# Patient Record
Sex: MALE | Race: WHITE | NOT HISPANIC OR LATINO | Employment: FULL TIME | URBAN - METROPOLITAN AREA
[De-identification: names, ages, dates, MRNs, and addresses within clinical notes are randomized per-mention and may not be internally consistent; named-entity substitution may affect disease eponyms.]

---

## 2018-01-09 DIAGNOSIS — E78.5 HYPERLIPIDEMIA, UNSPECIFIED HYPERLIPIDEMIA TYPE: ICD-10-CM

## 2018-01-09 DIAGNOSIS — I10 ESSENTIAL HYPERTENSION: ICD-10-CM

## 2018-01-10 RX ORDER — SIMVASTATIN 40 MG
TABLET ORAL
Qty: 15 TAB | Refills: 0 | Status: SHIPPED | OUTPATIENT
Start: 2018-01-10 | End: 2018-11-21

## 2018-01-10 RX ORDER — METOPROLOL SUCCINATE 100 MG/1
TABLET, EXTENDED RELEASE ORAL
Qty: 15 TAB | Refills: 0 | Status: SHIPPED | OUTPATIENT
Start: 2018-01-10 | End: 2018-11-21

## 2018-01-10 RX ORDER — LISINOPRIL AND HYDROCHLOROTHIAZIDE 20; 12.5 MG/1; MG/1
TABLET ORAL
Qty: 15 TAB | Refills: 0 | Status: SHIPPED | OUTPATIENT
Start: 2018-01-10 | End: 2018-11-21

## 2018-11-21 ENCOUNTER — OFFICE VISIT (OUTPATIENT)
Dept: CARDIOLOGY | Facility: MEDICAL CENTER | Age: 48
End: 2018-11-21
Payer: COMMERCIAL

## 2018-11-21 VITALS
DIASTOLIC BLOOD PRESSURE: 80 MMHG | SYSTOLIC BLOOD PRESSURE: 118 MMHG | OXYGEN SATURATION: 97 % | BODY MASS INDEX: 28.61 KG/M2 | WEIGHT: 211.2 LBS | HEIGHT: 72 IN | HEART RATE: 70 BPM

## 2018-11-21 DIAGNOSIS — I25.10 CORONARY ARTERY DISEASE INVOLVING NATIVE CORONARY ARTERY OF NATIVE HEART WITHOUT ANGINA PECTORIS: ICD-10-CM

## 2018-11-21 DIAGNOSIS — R55 SYNCOPE AND COLLAPSE: ICD-10-CM

## 2018-11-21 DIAGNOSIS — E78.5 DYSLIPIDEMIA: ICD-10-CM

## 2018-11-21 DIAGNOSIS — I10 ESSENTIAL HYPERTENSION, BENIGN: ICD-10-CM

## 2018-11-21 LAB — EKG IMPRESSION: NORMAL

## 2018-11-21 PROCEDURE — 99214 OFFICE O/P EST MOD 30 MIN: CPT | Performed by: INTERNAL MEDICINE

## 2018-11-21 PROCEDURE — 93000 ELECTROCARDIOGRAM COMPLETE: CPT | Performed by: INTERNAL MEDICINE

## 2018-11-21 RX ORDER — ATORVASTATIN CALCIUM 40 MG/1
40 TABLET, FILM COATED ORAL NIGHTLY
COMMUNITY
End: 2019-11-12 | Stop reason: SDUPTHER

## 2018-11-21 RX ORDER — METOPROLOL SUCCINATE 50 MG/1
50 TABLET, EXTENDED RELEASE ORAL DAILY
Qty: 90 TAB | Refills: 3 | Status: SHIPPED | OUTPATIENT
Start: 2018-11-21 | End: 2019-12-04 | Stop reason: SDUPTHER

## 2018-11-21 RX ORDER — LISINOPRIL 20 MG/1
20 TABLET ORAL DAILY
Qty: 90 TAB | Refills: 3 | Status: SHIPPED | OUTPATIENT
Start: 2018-11-21 | End: 2019-12-04 | Stop reason: SDUPTHER

## 2018-11-21 ASSESSMENT — ENCOUNTER SYMPTOMS
NEUROLOGICAL NEGATIVE: 1
NAUSEA: 0
FEVER: 0
SHORTNESS OF BREATH: 0
DOUBLE VISION: 0
COUGH: 0
MUSCULOSKELETAL NEGATIVE: 1
DIZZINESS: 0
BLURRED VISION: 0
WEIGHT LOSS: 0
VOMITING: 0
CARDIOVASCULAR NEGATIVE: 1
NERVOUS/ANXIOUS: 0
CHILLS: 0
CONSTITUTIONAL NEGATIVE: 1
EYES NEGATIVE: 1
PALPITATIONS: 0
BRUISES/BLEEDS EASILY: 0
CLAUDICATION: 0
MYALGIAS: 0
PSYCHIATRIC NEGATIVE: 1
RESPIRATORY NEGATIVE: 1
WEAKNESS: 0
FOCAL WEAKNESS: 0
ABDOMINAL PAIN: 0
GASTROINTESTINAL NEGATIVE: 1
DEPRESSION: 0
HEADACHES: 0

## 2018-11-21 NOTE — PROGRESS NOTES
Chief Complaint   Patient presents with   • Coronary Artery Disease       Subjective:   Peter Isbell is a 47 y.o. male who presents today for follow up of coronary artery disease.    Since the patient's last visit on 11/01/16 with Devorah Hines, he has been doing well clinically until one week ago when he suffered a syncopal episode. He was in [place] sitting at a bar having dinner when he began to feel dizziness. He denies associated chest pain, shortness of breath, palpitations, nausea/vomiting or diaphoresis. He was sitting at a very uncomfortable chair that was pressing on back of his leg.    Past Medical History:   Diagnosis Date   • CAD (coronary artery disease) 12/17/2012   • Heart murmur    • Hyperlipidemia 12/1/2012   • Hypertension      Past Surgical History:   Procedure Laterality Date   • CARDIAC CATH  11/30/12    LAD plague rupt, no stent.     Family History   Problem Relation Age of Onset   • Hypertension Mother    • Heart Disease Father 47        cad     Social History     Social History   • Marital status:      Spouse name: N/A   • Number of children: N/A   • Years of education: N/A     Occupational History   • Not on file.     Social History Main Topics   • Smoking status: Never Smoker   • Smokeless tobacco: Never Used   • Alcohol use Yes      Comment: occ   • Drug use: No   • Sexual activity: Not on file     Other Topics Concern   • Not on file     Social History Narrative   • No narrative on file     No Known Allergies     Medications reviewed.    Outpatient Encounter Prescriptions as of 11/21/2018   Medication Sig Dispense Refill   • atorvastatin (LIPITOR) 40 MG Tab Take 40 mg by mouth every evening.     • metoprolol SR (TOPROL XL) 100 MG TABLET SR 24 HR TAKE 1 TABLET BY MOUTH EVERYDAY. 15 Tab 0   • lisinopril-hydrochlorothiazide (PRINZIDE, ZESTORETIC) 20-12.5 MG per tablet TAKE 1 TABLET BY MOUTH EVERYDAY. 15 Tab 0   • aspirin EC (ECOTRIN) 81 MG Tablet Delayed Response Take 81  mg by mouth. 1/2 tablet qd     • [DISCONTINUED] simvastatin (ZOCOR) 40 MG Tab TAKE 1 TABLET BY MOUTH EVERYDAY. 15 Tab 0     No facility-administered encounter medications on file as of 11/21/2018.      Review of Systems   Constitutional: Negative.  Negative for chills, fever, malaise/fatigue and weight loss.   HENT: Negative.  Negative for hearing loss.    Eyes: Negative.  Negative for blurred vision and double vision.   Respiratory: Negative.  Negative for cough and shortness of breath.    Cardiovascular: Negative.  Negative for chest pain, palpitations, claudication and leg swelling.   Gastrointestinal: Negative.  Negative for abdominal pain, nausea and vomiting.   Genitourinary: Negative.  Negative for dysuria and urgency.   Musculoskeletal: Negative.  Negative for joint pain and myalgias.   Skin: Negative.  Negative for itching and rash.   Neurological: Negative.  Negative for dizziness, focal weakness, weakness and headaches.   Endo/Heme/Allergies: Negative.  Does not bruise/bleed easily.   Psychiatric/Behavioral: Negative.  Negative for depression. The patient is not nervous/anxious.         Objective:   /80 (BP Location: Left arm, Patient Position: Sitting, BP Cuff Size: Adult)   Pulse 70   Ht 1.829 m (6')   Wt 95.8 kg (211 lb 3.2 oz)   SpO2 97%   BMI 28.64 kg/m²     Physical Exam   Constitutional: He is oriented to person, place, and time. He appears well-developed and well-nourished.   HENT:   Head: Normocephalic and atraumatic.   Eyes: Pupils are equal, round, and reactive to light. Conjunctivae are normal.   Neck: Normal range of motion. Neck supple.   Cardiovascular: Normal rate and regular rhythm.    Pulmonary/Chest: Effort normal and breath sounds normal.   Abdominal: Soft. Bowel sounds are normal.   Musculoskeletal: Normal range of motion.   Neurological: He is alert and oriented to person, place, and time.   Skin: Skin is warm and dry.   Psychiatric: He has a normal mood and affect.      CARDIAC STUDIES/PROCEDURES:    CARDIAC CATHETERIZATION CONCLUSIONS by Dr. Garcia (12/01/12)  Cardiac catheterization showing proximal LAD findings suggestive of ruptured plaque with associated nonobstructive thrombus with SAEED 3 flow.  (study result reviewed)    CAROTID ULTRASOUND (04/08/15)  Normal bilateral carotid exam.   No prior study is available for comparison..   (study result reviewed)    ECHOCARDIOGRAM CONCLUSIONS (04/08/15)  Left ventricular ejection fraction is 65% to 70%.  Mild concentric left ventricular hypertrophy.  Unable to estimate pulmonary artery pressure due to an inadequate   tricuspid regurgitant jet.  No evidence of valvular abnormality noted.  No prior study is available for comparison.   (study result reviewed)    EKG was ordered for coronary artery disease, performed on (11/21/18) and reviewed: EKG shows sinus rhythm.    Laboratory results of (08/14/15) were reviewed. Cholesterol profile of 144/98/43/81 noted.    Assessment:     1. Coronary artery disease involving native coronary artery of native heart without angina pectoris  EKG   2. Syncope and collapse     3. Essential hypertension, benign     4. Dyslipidemia       Medical Decision Making:  Today's Assessment / Status / Plan:     1. Coronary artery disease: He remains clinically stable. We will continue with current medical care.  2. Syncope likely vasovagal phenomenon: He is clinically doing well without recurrence of his syncope.  3. Hypertension: Blood pressure has been low since he lost significant loss of weight following diet change. We will reduce his Toprol to 50 mg QD, and discontinue HCTZ and assess his blood pressure.  4. Hyperlipidemia: He is doing well on statin therapy without myalgia symptoms. We will repeat labs including fasting lipid profile.    We will follow up in three months.    CC Rosalba Mallory

## 2018-11-21 NOTE — LETTER
[facility]   [address]  [place] [address]  Phone: [phone]  Fax: [phone]              Peter Isbell  1970    Encounter Date: 11/21/2018    Akil Andrews M.D.          PROGRESS NOTE:  Chief Complaint   Patient presents with   • Coronary Artery Disease       Subjective:   Peter Isbell is a 47 y.o. male who presents today for follow up of coronary artery disease.    Since the patient's last visit on 11/01/16 with Devorah Hines, he has been doing well clinically. He denies chest pain, shortness of breath, palpitations, nausea/vomiting or diaphoresis.    Past Medical History:   Diagnosis Date   • CAD (coronary artery disease) 12/17/2012   • Heart murmur    • Hyperlipidemia 12/1/2012   • Hypertension      Past Surgical History:   Procedure Laterality Date   • CARDIAC CATH  11/30/12    LAD plague rupt, no stent.     Family History   Problem Relation Age of Onset   • Hypertension Mother    • Heart Disease Father 47        cad     Social History     Social History   • Marital status:      Spouse name: N/A   • Number of children: N/A   • Years of education: N/A     Occupational History   • Not on file.     Social History Main Topics   • Smoking status: Never Smoker   • Smokeless tobacco: Never Used   • Alcohol use Yes      Comment: occ   • Drug use: No   • Sexual activity: Not on file     Other Topics Concern   • Not on file     Social History Narrative   • No narrative on file     No Known Allergies     Medications reviewed.    Outpatient Encounter Prescriptions as of 11/21/2018   Medication Sig Dispense Refill   • atorvastatin (LIPITOR) 40 MG Tab Take 40 mg by mouth every evening.     • metoprolol SR (TOPROL XL) 100 MG TABLET SR 24 HR TAKE 1 TABLET BY MOUTH EVERYDAY. 15 Tab 0   • lisinopril-hydrochlorothiazide (PRINZIDE, ZESTORETIC) 20-12.5 MG per tablet TAKE 1 TABLET BY MOUTH EVERYDAY. 15 Tab 0   • aspirin EC (ECOTRIN) 81 MG  Tablet Delayed Response Take 81 mg by mouth. 1/2 tablet qd     • [DISCONTINUED] simvastatin (ZOCOR) 40 MG Tab TAKE 1 TABLET BY MOUTH EVERYDAY. 15 Tab 0     No facility-administered encounter medications on file as of 11/21/2018.      Review of Systems   Constitutional: Negative.  Negative for chills, fever, malaise/fatigue and weight loss.   HENT: Negative.  Negative for hearing loss.    Eyes: Negative.  Negative for blurred vision and double vision.   Respiratory: Negative.  Negative for cough and shortness of breath.    Cardiovascular: Negative.  Negative for chest pain, palpitations, claudication and leg swelling.   Gastrointestinal: Negative.  Negative for abdominal pain, nausea and vomiting.   Genitourinary: Negative.  Negative for dysuria and urgency.   Musculoskeletal: Negative.  Negative for joint pain and myalgias.   Skin: Negative.  Negative for itching and rash.   Neurological: Negative.  Negative for dizziness, focal weakness, weakness and headaches.   Endo/Heme/Allergies: Negative.  Does not bruise/bleed easily.   Psychiatric/Behavioral: Negative.  Negative for depression. The patient is not nervous/anxious.         Objective:   /80 (BP Location: Left arm, Patient Position: Sitting, BP Cuff Size: Adult)   Pulse 70   Ht 1.829 m (6')   Wt 95.8 kg (211 lb 3.2 oz)   SpO2 97%   BMI 28.64 kg/m²      Physical Exam   Constitutional: He is oriented to person, place, and time. He appears well-developed and well-nourished.   HENT:   Head: Normocephalic and atraumatic.   Eyes: Pupils are equal, round, and reactive to light. Conjunctivae are normal.   Neck: Normal range of motion. Neck supple.   Cardiovascular: Normal rate and regular rhythm.    Pulmonary/Chest: Effort normal and breath sounds normal.   Abdominal: Soft. Bowel sounds are normal.   Musculoskeletal: Normal range of motion.   Neurological: He is alert and oriented to person, place, and time.   Skin: Skin is warm and dry.   Psychiatric: He has  a normal mood and affect.     CARDIAC STUDIES/PROCEDURES:    CARDIAC CATHETERIZATION CONCLUSIONS by Dr. Garcia (12/01/12)  Cardiac catheterization showing proximal LAD findings suggestive of ruptured plaque with associated nonobstructive thrombus with SAEED 3 flow.  (study result reviewed)    CAROTID ULTRASOUND (04/08/15)  Normal bilateral carotid exam.   No prior study is available for comparison..   (study result reviewed)    ECHOCARDIOGRAM CONCLUSIONS (04/08/15)  Left ventricular ejection fraction is 65% to 70%.  Mild concentric left ventricular hypertrophy.  Unable to estimate pulmonary artery pressure due to an inadequate   tricuspid regurgitant jet.  No evidence of valvular abnormality noted.  No prior study is available for comparison.   (study result reviewed)    EKG was ordered for coronary artery disease, performed on (11/21/18) and reviewed: EKG shows sinus rhythm.    Laboratory results of (08/14/15) were reviewed. Cholesterol profile of 144/98/43/81 noted.    Assessment:     1. Coronary artery disease involving native coronary artery of native heart without angina pectoris  EKG   2. Essential hypertension, benign     3. Dyslipidemia       Medical Decision Making:  Today's Assessment / Status / Plan:     1. Coronary artery disease: He remains clinically stable. We will continue with current medical care.  2. Hypertension: Blood pressure is well controlled.  3. Hyperlipidemia: He is doing well on statin therapy without myalgia symptoms. We will repeat labs including fasting lipid profile.    We will follow up the patient in one year.    CC Rosalba Mallory

## 2019-01-09 ENCOUNTER — OFFICE VISIT (OUTPATIENT)
Dept: CARDIOLOGY | Facility: MEDICAL CENTER | Age: 49
End: 2019-01-09
Payer: COMMERCIAL

## 2019-01-09 VITALS
BODY MASS INDEX: 28.99 KG/M2 | HEART RATE: 64 BPM | WEIGHT: 214 LBS | OXYGEN SATURATION: 100 % | SYSTOLIC BLOOD PRESSURE: 140 MMHG | DIASTOLIC BLOOD PRESSURE: 90 MMHG | HEIGHT: 72 IN

## 2019-01-09 DIAGNOSIS — I10 ESSENTIAL HYPERTENSION, BENIGN: ICD-10-CM

## 2019-01-09 DIAGNOSIS — I25.10 CORONARY ARTERY DISEASE WITHOUT ANGINA PECTORIS, UNSPECIFIED VESSEL OR LESION TYPE, UNSPECIFIED WHETHER NATIVE OR TRANSPLANTED HEART: ICD-10-CM

## 2019-01-09 DIAGNOSIS — I25.10 CORONARY ARTERY DISEASE INVOLVING NATIVE CORONARY ARTERY OF NATIVE HEART WITHOUT ANGINA PECTORIS: ICD-10-CM

## 2019-01-09 PROBLEM — R55 SYNCOPE AND COLLAPSE: Status: RESOLVED | Noted: 2018-11-21 | Resolved: 2019-01-09

## 2019-01-09 PROBLEM — E78.5 DYSLIPIDEMIA: Status: RESOLVED | Noted: 2018-11-21 | Resolved: 2019-01-09

## 2019-01-09 PROCEDURE — 99214 OFFICE O/P EST MOD 30 MIN: CPT | Performed by: INTERNAL MEDICINE

## 2019-01-09 ASSESSMENT — ENCOUNTER SYMPTOMS
COUGH: 0
BRUISES/BLEEDS EASILY: 0
TREMORS: 0
NERVOUS/ANXIOUS: 0
DIAPHORESIS: 0
INSOMNIA: 0
SENSORY CHANGE: 0
NECK PAIN: 0
BLURRED VISION: 0
MYALGIAS: 0
DEPRESSION: 0
NAUSEA: 0
SHORTNESS OF BREATH: 0
PND: 0
PALPITATIONS: 0
DOUBLE VISION: 0
HEARTBURN: 0

## 2019-01-09 NOTE — PROGRESS NOTES
Chief Complaint   Patient presents with   • HTN (Controlled)     follow up       Subjective:   Peter Isbell is a 48 y.o. male who presents today in follow-up after his STEMI in 2012 with LAD plaque, he never had direct stenting    Amazingly he has been lost more than 50 pounds through diet and exercise.  Got a promotion at work enjoying traveling.  Eating very well    Has a  never gets chest pain.  My partner decreased his beta-blocker because he had a near syncopal episode earlier last year    Enjoying himself    Past Medical History:   Diagnosis Date   • CAD (coronary artery disease) 12/17/2012   • Heart murmur    • Hyperlipidemia 12/1/2012   • Hypertension      Past Surgical History:   Procedure Laterality Date   • CARDIAC CATH  11/30/12    LAD plague rupt, no stent.     Family History   Problem Relation Age of Onset   • Hypertension Mother    • Heart Disease Father 47        cad     Social History     Social History   • Marital status:      Spouse name: N/A   • Number of children: N/A   • Years of education: N/A     Occupational History   • Not on file.     Social History Main Topics   • Smoking status: Never Smoker   • Smokeless tobacco: Never Used   • Alcohol use Yes      Comment: occ   • Drug use: No   • Sexual activity: Not on file     Other Topics Concern   • Not on file     Social History Narrative   • No narrative on file     No Known Allergies  Outpatient Encounter Prescriptions as of 1/9/2019   Medication Sig Dispense Refill   • atorvastatin (LIPITOR) 40 MG Tab Take 40 mg by mouth every evening.     • metoprolol SR (TOPROL XL) 50 MG TABLET SR 24 HR Take 1 Tab by mouth every day. 90 Tab 3   • lisinopril (PRINIVIL) 20 MG Tab Take 1 Tab by mouth every day. 90 Tab 3   • aspirin EC (ECOTRIN) 81 MG Tablet Delayed Response Take 81 mg by mouth.       No facility-administered encounter medications on file as of 1/9/2019.      Review of Systems   Constitutional: Negative for diaphoresis  and malaise/fatigue.   HENT: Negative for hearing loss.    Eyes: Negative for blurred vision and double vision.   Respiratory: Negative for cough and shortness of breath.    Cardiovascular: Negative for chest pain, palpitations and PND.   Gastrointestinal: Negative for heartburn and nausea.   Genitourinary: Negative for dysuria and urgency.   Musculoskeletal: Negative for myalgias and neck pain.   Neurological: Negative for tremors and sensory change.   Endo/Heme/Allergies: Does not bruise/bleed easily.   Psychiatric/Behavioral: Negative for depression. The patient is not nervous/anxious and does not have insomnia.    All other systems reviewed and are negative.       Objective:   /90 (BP Location: Left arm, Patient Position: Sitting)   Pulse 64   Ht 1.829 m (6')   Wt 97.1 kg (214 lb)   SpO2 100%   BMI 29.02 kg/m²     Physical Exam   Constitutional: He is oriented to person, place, and time. He appears well-developed and well-nourished.   HENT:   Head: Normocephalic and atraumatic.   Eyes: Pupils are equal, round, and reactive to light. EOM are normal.   Neck: No JVD present. No thyromegaly present.   Cardiovascular: Normal rate, regular rhythm and intact distal pulses.    No murmur heard.  Pulmonary/Chest: Breath sounds normal. No respiratory distress.   Abdominal: Bowel sounds are normal. He exhibits no distension.   Musculoskeletal: He exhibits no edema or tenderness.   Lymphadenopathy:     He has no cervical adenopathy.   Neurological: He is alert and oriented to person, place, and time. He exhibits normal muscle tone. Coordination normal.   Skin: Skin is warm and dry. No rash noted.   Psychiatric: He has a normal mood and affect. His behavior is normal.       Assessment:     1. Coronary artery disease involving native coronary artery of native heart without angina pectoris  COMP METABOLIC PANEL    CBC WITH DIFFERENTIAL    LIPID PANEL    Cardiac Stress Test Treadmill Only   2. Essential hypertension,  benign         Medical Decision Making:  Today's Assessment / Status / Plan:     Coronary disease  We looked at his echocardiogram from several years ago, no significant damage  By national guidelines we did order an exercise tolerance test as well as an echo  Statin aspirin  Laboratory data was ordered including lipid CMP and CBC    Syncope  Talked at length about physiology, sounds vasovagal [place] agree with medication changes, we talked about weight loss he will decrease his beta-blocker further if his blood pressure is quite low    Risk for further events including heart attack  Talked about carotid artery screening  Talked about home monitoring including glucose  Very impressed with his improvements as he ages    RTC one year sooner if testing indicates

## 2019-11-12 DIAGNOSIS — E78.5 HYPERLIPIDEMIA, UNSPECIFIED HYPERLIPIDEMIA TYPE: Primary | ICD-10-CM

## 2019-11-13 RX ORDER — ATORVASTATIN CALCIUM 40 MG/1
40 TABLET, FILM COATED ORAL DAILY
Qty: 90 TAB | Refills: 2 | Status: SHIPPED | OUTPATIENT
Start: 2019-11-13 | End: 2020-08-17 | Stop reason: SDUPTHER

## 2020-05-15 ENCOUNTER — APPOINTMENT (RX ONLY)
Dept: URBAN - METROPOLITAN AREA CLINIC 4 | Facility: CLINIC | Age: 50
Setting detail: DERMATOLOGY
End: 2020-05-15

## 2020-05-15 DIAGNOSIS — L82.0 INFLAMED SEBORRHEIC KERATOSIS: ICD-10-CM | Status: INADEQUATELY CONTROLLED

## 2020-05-15 DIAGNOSIS — L81.4 OTHER MELANIN HYPERPIGMENTATION: ICD-10-CM | Status: STABLE

## 2020-05-15 DIAGNOSIS — Z71.89 OTHER SPECIFIED COUNSELING: ICD-10-CM

## 2020-05-15 DIAGNOSIS — L57.8 OTHER SKIN CHANGES DUE TO CHRONIC EXPOSURE TO NONIONIZING RADIATION: ICD-10-CM | Status: INADEQUATELY CONTROLLED

## 2020-05-15 PROBLEM — D48.5 NEOPLASM OF UNCERTAIN BEHAVIOR OF SKIN: Status: ACTIVE | Noted: 2020-05-15

## 2020-05-15 PROCEDURE — ? COUNSELING

## 2020-05-15 PROCEDURE — 11102 TANGNTL BX SKIN SINGLE LES: CPT

## 2020-05-15 PROCEDURE — ? BIOPSY BY SHAVE METHOD

## 2020-05-15 PROCEDURE — 99202 OFFICE O/P NEW SF 15 MIN: CPT | Mod: 25

## 2020-05-15 ASSESSMENT — LOCATION DETAILED DESCRIPTION DERM
LOCATION DETAILED: LEFT CENTRAL MALAR CHEEK
LOCATION DETAILED: LEFT MEDIAL FOREHEAD
LOCATION DETAILED: RIGHT SUPERIOR FOREHEAD
LOCATION DETAILED: RIGHT INFERIOR CENTRAL MALAR CHEEK
LOCATION DETAILED: LEFT CHIN

## 2020-05-15 ASSESSMENT — LOCATION SIMPLE DESCRIPTION DERM
LOCATION SIMPLE: LEFT CHEEK
LOCATION SIMPLE: RIGHT CHEEK
LOCATION SIMPLE: CHIN
LOCATION SIMPLE: RIGHT FOREHEAD
LOCATION SIMPLE: LEFT FOREHEAD

## 2020-05-15 ASSESSMENT — LOCATION ZONE DERM: LOCATION ZONE: FACE

## 2020-05-15 NOTE — PROCEDURE: BIOPSY BY SHAVE METHOD
Detail Level: Detailed
Depth Of Biopsy: dermis
Was A Bandage Applied: Yes
Size Of Lesion In Cm: 0.8
X Size Of Lesion In Cm: 0
Biopsy Type: H and E
Biopsy Method: 15 blade
Anesthesia Type: 1% lidocaine with epinephrine
Hemostasis: Aluminum Chloride and Electrocautery
Wound Care: Aquaphor
Dressing: Band-Aid
Destruction After The Procedure: No
Type Of Destruction Used: Curettage
Curettage Text: The wound bed was treated with curettage after the biopsy was performed.
Cryotherapy Text: The wound bed was treated with cryotherapy after the biopsy was performed.
Electrodesiccation Text: The wound bed was treated with electrodesiccation after the biopsy was performed.
Electrodesiccation And Curettage Text: The wound bed was treated with electrodesiccation and curettage after the biopsy was performed.
Silver Nitrate Text: The wound bed was treated with silver nitrate after the biopsy was performed.
Lab: [id number]
Lab Facility: 
Consent: Written consent was obtained and risks were reviewed including but not limited to scarring, infection, bleeding, scabbing, incomplete removal, nerve damage and allergy to anesthesia.
Post-Care Instructions: I reviewed with the patient in detail post-care instructions. Patient is to keep the biopsy site dry overnight, and then apply bacitracin twice daily until healed. Patient may apply hydrogen peroxide soaks to remove any crusting.
Notification Instructions: Patient will be notified of biopsy results. However, patient instructed to call the office if not contacted within 2 weeks.
Billing Type: Third-Party Bill
Information: Selecting Yes will display possible errors in your note based on the variables you have selected. This validation is only offered as a suggestion for you. PLEASE NOTE THAT THE VALIDATION TEXT WILL BE REMOVED WHEN YOU FINALIZE YOUR NOTE. IF YOU WANT TO FAX A PRELIMINARY NOTE YOU WILL NEED TO TOGGLE THIS TO 'NO' IF YOU DO NOT WANT IT IN YOUR FAXED NOTE.

## 2020-08-17 DIAGNOSIS — E78.5 HYPERLIPIDEMIA, UNSPECIFIED HYPERLIPIDEMIA TYPE: ICD-10-CM

## 2020-08-18 RX ORDER — ATORVASTATIN CALCIUM 40 MG/1
40 TABLET, FILM COATED ORAL DAILY
Qty: 90 TAB | Refills: 0 | Status: SHIPPED | OUTPATIENT
Start: 2020-08-18

## 2020-10-28 ENCOUNTER — TELEPHONE (OUTPATIENT)
Dept: SCHEDULING | Facility: IMAGING CENTER | Age: 50
End: 2020-10-28

## 2020-11-19 ENCOUNTER — APPOINTMENT (OUTPATIENT)
Dept: MEDICAL GROUP | Facility: MEDICAL CENTER | Age: 50
End: 2020-11-19
Payer: COMMERCIAL